# Patient Record
Sex: MALE | URBAN - METROPOLITAN AREA
[De-identification: names, ages, dates, MRNs, and addresses within clinical notes are randomized per-mention and may not be internally consistent; named-entity substitution may affect disease eponyms.]

---

## 2017-07-05 VITALS
TEMPERATURE: 97 F | WEIGHT: 270.07 LBS | SYSTOLIC BLOOD PRESSURE: 131 MMHG | RESPIRATION RATE: 16 BRPM | DIASTOLIC BLOOD PRESSURE: 74 MMHG | OXYGEN SATURATION: 98 % | HEIGHT: 75 IN | HEART RATE: 70 BPM

## 2017-07-06 ENCOUNTER — OUTPATIENT (OUTPATIENT)
Dept: OUTPATIENT SERVICES | Facility: HOSPITAL | Age: 37
LOS: 1 days | Discharge: HOME | End: 2017-07-06
Payer: COMMERCIAL

## 2017-07-06 PROCEDURE — 88302 TISSUE EXAM BY PATHOLOGIST: CPT

## 2017-07-06 PROCEDURE — 55250 REMOVAL OF SPERM DUCT(S): CPT

## 2017-07-06 RX ORDER — CEPHALEXIN 500 MG
1 CAPSULE ORAL
Qty: 10 | Refills: 0 | OUTPATIENT
Start: 2017-07-06 | End: 2017-07-11

## 2017-07-06 RX ORDER — OXYCODONE HYDROCHLORIDE 5 MG/1
1 TABLET ORAL
Qty: 21 | Refills: 0 | OUTPATIENT
Start: 2017-07-06

## 2017-07-06 RX ORDER — DOCUSATE SODIUM 100 MG
1 CAPSULE ORAL
Qty: 30 | Refills: 0 | OUTPATIENT
Start: 2017-07-06 | End: 2017-07-21

## 2017-07-11 DIAGNOSIS — Z30.2 ENCOUNTER FOR STERILIZATION: ICD-10-CM

## 2017-07-11 LAB — SURGICAL PATHOLOGY STUDY: SIGNIFICANT CHANGE UP

## 2019-10-18 NOTE — ASU PATIENT PROFILE, ADULT - REASON FOR ADMISSION, PROFILE
Patient:   WASHINGTON MONTES            MRN: [id number]            FIN: [id number]              Age:   83 years     Sex:  MALE     :  38   Associated Diagnoses:   None   Author:   FRED BOWIE     Physical Examination   VS/Measurements     Vitals between:   17-OCT-2019 06:47:04   TO   18-OCT-2019 06:47:04                   LAST RESULT MINIMUM MAXIMUM  Temperature 35.9 35.1 36.2  Heart Rate 84 65 94  Respiratory Rate 16 16 16  NISBP           121 119 139  NIDBP           60 58 78  NIMBP           80 78 98  SpO2                    93 92 95      Review / Management   Laboratory results:     No Qualifying Labs are resulted on this patient in the last 24 hours  .    Subjective:  NAEON. DVT found and on therapeutic AC per medicine.   Objective:  VS: WNL, Afebrile   GEN: patient alert and oriented, no acute distress   CV: pedal pulses palpalbe  PULM: respirations are non-labored   MS: LLE with erythema recieding from previously marked outline, pain much improved. 1cm scabbed lesion ant/medial calf proximal w/o surrounding erythema, no fluctuance, swelling noted over dorsum of L foot, DF/PF/EHL intact BLE, SILT BLE, compartments soft, non-tender BLE,  erythema is improving, swelling or drainage, negative marlin  PSYCH: appropriate, cooperative with exam   Assessment/Plan:    83yoM direct admit from Dr. Koehler clinic for suspected LLE cellulitis  1. WBAT LLE, able to get up out of bed with assist  2. General diet  3. ID consult appreciate recs  4. Ancef for abx  5. On therapeutic lovenox for DVT per medicine  Daniel Menezes MD   Orthopaedic Surgery, PGY-2  Phone: [phone]  Pager: [phone]  On call pager (post 5pm and weekends) [phone]   Vasectomy

## 2024-04-03 NOTE — ASU PATIENT PROFILE, ADULT - PRO MENTAL HEALTH SX RECENT
Photo Preface (Leave Blank If You Do Not Want): Photographs were obtained today for clinical monitoring Detail Level: Zone none